# Patient Record
(demographics unavailable — no encounter records)

---

## 2025-02-18 NOTE — HISTORY OF PRESENT ILLNESS
[FreeTextEntry1] : Patient is a 63yo M here to establish care. He has multiple urologic issues to discuss.  He was told by his PCP he should get a prostate exam. Had recent bloodwork and thinks his PSA was within normal limits but does not have the results. Also has bilateral hydroceles and has a recent ultrasound. He is not bothered by the hydroceles, has noticed bilateral varicoceles for many years. Ultrasound was otherwise normal. Complains of mild ED. Has not tried medications in the past.  Has weak urinary steam, gets up 1-2x a night. Sometimes feels he does not fully empty his bladder. Has not been on urologic meds before.   Denies family history of urologic diseases or cancers. No smoking history.

## 2025-02-18 NOTE — PHYSICAL EXAM
[Normal Appearance] : normal appearance [Well Groomed] : well groomed [General Appearance - In No Acute Distress] : no acute distress [Edema] : no peripheral edema [Respiration, Rhythm And Depth] : normal respiratory rhythm and effort [Exaggerated Use Of Accessory Muscles For Inspiration] : no accessory muscle use [Abdomen Soft] : soft [Abdomen Tenderness] : non-tender [Costovertebral Angle Tenderness] : no ~M costovertebral angle tenderness [Urinary Bladder Findings] : the bladder was normal on palpation [Prostate Tenderness] : the prostate was not tender [No Prostate Nodules] : no prostate nodules [Normal Station and Gait] : the gait and station were normal for the patient's age [] : no rash [No Focal Deficits] : no focal deficits [Oriented To Time, Place, And Person] : oriented to person, place, and time [Affect] : the affect was normal [Mood] : the mood was normal [No Palpable Adenopathy] : no palpable adenopathy [de-identified] : mildly enlarged prostate on exam

## 2025-02-18 NOTE — ASSESSMENT
[FreeTextEntry1] : Prostate cancer screening, SAMI benign - obtain PSA level from PCP office - PSA once annually, SAMI annually  Bilateral hydroceles - moderate bilateral hydroceles with debris bilaterally, they do not bother him - no intervention needed unless become bothersome  Erectile dysfunction - For ED, we discussed that ED etiology can be psychogenic, arterial, venous leak, neurologic or a combination. Penile Ultrasound can be performed to evaluate cardiovascular causes. We discussed treatment options include oral PDE5 inhibitors, intracavernosal injections, or penile implant placement surgery.For ED, we discussed that ED etiology can be psychogenic, arterial, venous leak, neurologic or a combination. Penile Ultrasound can be performed to evaluate cardiovascular causes. We discussed treatment options include oral PDE5 inhibitors, intracavernosal injections, or penile implant placement surgery. - patient does not want to try medications or any other treatments at this point  Weak urinary stream - obtain renal and bladder ultrasound with prostate visualization - discussed possibly trying medications based on results  - f/u for RBUS in office in 1-2 weeks

## 2025-02-18 NOTE — PHYSICAL EXAM
[Normal Appearance] : normal appearance [Well Groomed] : well groomed [General Appearance - In No Acute Distress] : no acute distress [Edema] : no peripheral edema [Respiration, Rhythm And Depth] : normal respiratory rhythm and effort [Exaggerated Use Of Accessory Muscles For Inspiration] : no accessory muscle use [Abdomen Soft] : soft [Abdomen Tenderness] : non-tender [Costovertebral Angle Tenderness] : no ~M costovertebral angle tenderness [Urinary Bladder Findings] : the bladder was normal on palpation [Prostate Tenderness] : the prostate was not tender [No Prostate Nodules] : no prostate nodules [Normal Station and Gait] : the gait and station were normal for the patient's age [] : no rash [No Focal Deficits] : no focal deficits [Oriented To Time, Place, And Person] : oriented to person, place, and time [Affect] : the affect was normal [Mood] : the mood was normal [No Palpable Adenopathy] : no palpable adenopathy [de-identified] : mildly enlarged prostate on exam

## 2025-02-18 NOTE — HISTORY OF PRESENT ILLNESS
[FreeTextEntry1] : Patient is a 61yo M here to establish care. He has multiple urologic issues to discuss.  He was told by his PCP he should get a prostate exam. Had recent bloodwork and thinks his PSA was within normal limits but does not have the results. Also has bilateral hydroceles and has a recent ultrasound. He is not bothered by the hydroceles, has noticed bilateral varicoceles for many years. Ultrasound was otherwise normal. Complains of mild ED. Has not tried medications in the past.  Has weak urinary steam, gets up 1-2x a night. Sometimes feels he does not fully empty his bladder. Has not been on urologic meds before.   Denies family history of urologic diseases or cancers. No smoking history.